# Patient Record
Sex: MALE | Race: WHITE | Employment: OTHER | URBAN - METROPOLITAN AREA
[De-identification: names, ages, dates, MRNs, and addresses within clinical notes are randomized per-mention and may not be internally consistent; named-entity substitution may affect disease eponyms.]

---

## 2017-12-31 PROBLEM — I10 ESSENTIAL HYPERTENSION: Chronic | Status: ACTIVE | Noted: 2017-12-31

## 2017-12-31 PROBLEM — N40.0 PROSTATE HYPERTROPHY: Chronic | Status: ACTIVE | Noted: 2017-12-31

## 2017-12-31 PROBLEM — S12.000A: Status: ACTIVE | Noted: 2017-12-31

## 2017-12-31 PROBLEM — S22.000A CLOSED COMPRESSION FRACTURE OF THORACIC VERTEBRA (HCC): Status: ACTIVE | Noted: 2017-12-31

## 2017-12-31 PROBLEM — S06.0X9A CONCUSSION WITH LOSS OF CONSCIOUSNESS: Status: ACTIVE | Noted: 2017-12-31

## 2018-01-02 PROBLEM — S12.001A CLOSED NONDISPLACED FRACTURE OF FIRST CERVICAL VERTEBRA (HCC): Status: ACTIVE | Noted: 2017-12-31

## 2024-10-05 ENCOUNTER — HOSPITAL ENCOUNTER (EMERGENCY)
Age: 79
Discharge: HOME | End: 2024-10-05
Attending: STUDENT IN AN ORGANIZED HEALTH CARE EDUCATION/TRAINING PROGRAM
Payer: COMMERCIAL

## 2024-10-05 ENCOUNTER — APPOINTMENT (OUTPATIENT)
Dept: CT IMAGING | Age: 79
End: 2024-10-05
Payer: COMMERCIAL

## 2024-10-05 VITALS
HEART RATE: 75 BPM | TEMPERATURE: 97.8 F | OXYGEN SATURATION: 98 % | DIASTOLIC BLOOD PRESSURE: 69 MMHG | SYSTOLIC BLOOD PRESSURE: 154 MMHG | RESPIRATION RATE: 15 BRPM

## 2024-10-05 DIAGNOSIS — I62.00 SUBDURAL BLEEDING (HCC): Primary | ICD-10-CM

## 2024-10-05 LAB
ALBUMIN SERPL-MCNC: 4.5 G/DL (ref 3.5–5.2)
ALP SERPL-CCNC: 85 U/L (ref 40–129)
ALT SERPL-CCNC: 20 U/L (ref 0–40)
ANION GAP SERPL CALCULATED.3IONS-SCNC: 11 MMOL/L (ref 7–16)
AST SERPL-CCNC: 20 U/L (ref 0–39)
BASOPHILS # BLD: 0.02 K/UL (ref 0–0.2)
BASOPHILS NFR BLD: 0 % (ref 0–2)
BILIRUB SERPL-MCNC: 0.6 MG/DL (ref 0–1.2)
BUN SERPL-MCNC: 11 MG/DL (ref 6–23)
CALCIUM SERPL-MCNC: 9.7 MG/DL (ref 8.6–10.2)
CHLORIDE SERPL-SCNC: 95 MMOL/L (ref 98–107)
CO2 SERPL-SCNC: 28 MMOL/L (ref 22–29)
CREAT SERPL-MCNC: 0.8 MG/DL (ref 0.7–1.2)
EOSINOPHIL # BLD: 0.17 K/UL (ref 0.05–0.5)
EOSINOPHILS RELATIVE PERCENT: 3 % (ref 0–6)
ERYTHROCYTE [DISTWIDTH] IN BLOOD BY AUTOMATED COUNT: 13.5 % (ref 11.5–15)
GFR, ESTIMATED: 89 ML/MIN/1.73M2
GLUCOSE SERPL-MCNC: 130 MG/DL (ref 74–99)
HCT VFR BLD AUTO: 39.1 % (ref 37–54)
HGB BLD-MCNC: 13.3 G/DL (ref 12.5–16.5)
IMM GRANULOCYTES # BLD AUTO: 0.03 K/UL (ref 0–0.58)
IMM GRANULOCYTES NFR BLD: 0 % (ref 0–5)
INR PPP: 1
LYMPHOCYTES NFR BLD: 1.2 K/UL (ref 1.5–4)
LYMPHOCYTES RELATIVE PERCENT: 17 % (ref 20–42)
MAGNESIUM SERPL-MCNC: 1.7 MG/DL (ref 1.6–2.6)
MCH RBC QN AUTO: 31.1 PG (ref 26–35)
MCHC RBC AUTO-ENTMCNC: 34 G/DL (ref 32–34.5)
MCV RBC AUTO: 91.4 FL (ref 80–99.9)
MONOCYTES NFR BLD: 0.52 K/UL (ref 0.1–0.95)
MONOCYTES NFR BLD: 8 % (ref 2–12)
NEUTROPHILS NFR BLD: 72 % (ref 43–80)
NEUTS SEG NFR BLD: 4.96 K/UL (ref 1.8–7.3)
PARTIAL THROMBOPLASTIN TIME: 30.6 SEC (ref 24.5–35.1)
PARTIAL THROMBOPLASTIN TIME: 31 SEC (ref 24.5–35.1)
PLATELET # BLD AUTO: 252 K/UL (ref 130–450)
PMV BLD AUTO: 9.7 FL (ref 7–12)
POTASSIUM SERPL-SCNC: 4.4 MMOL/L (ref 3.5–5)
PROT SERPL-MCNC: 7.4 G/DL (ref 6.4–8.3)
PROTHROMBIN TIME: 10.9 SEC (ref 9.3–12.4)
RBC # BLD AUTO: 4.28 M/UL (ref 3.8–5.8)
SODIUM SERPL-SCNC: 134 MMOL/L (ref 132–146)
TROPONIN I SERPL HS-MCNC: 12 NG/L (ref 0–11)
TROPONIN I SERPL HS-MCNC: 9 NG/L (ref 0–11)
WBC OTHER # BLD: 6.9 K/UL (ref 4.5–11.5)

## 2024-10-05 PROCEDURE — 85025 COMPLETE CBC W/AUTO DIFF WBC: CPT

## 2024-10-05 PROCEDURE — 93005 ELECTROCARDIOGRAM TRACING: CPT | Performed by: STUDENT IN AN ORGANIZED HEALTH CARE EDUCATION/TRAINING PROGRAM

## 2024-10-05 PROCEDURE — 80053 COMPREHEN METABOLIC PANEL: CPT

## 2024-10-05 PROCEDURE — 85610 PROTHROMBIN TIME: CPT

## 2024-10-05 PROCEDURE — 6360000002 HC RX W HCPCS: Performed by: STUDENT IN AN ORGANIZED HEALTH CARE EDUCATION/TRAINING PROGRAM

## 2024-10-05 PROCEDURE — 96374 THER/PROPH/DIAG INJ IV PUSH: CPT

## 2024-10-05 PROCEDURE — 85730 THROMBOPLASTIN TIME PARTIAL: CPT

## 2024-10-05 PROCEDURE — 96375 TX/PRO/DX INJ NEW DRUG ADDON: CPT

## 2024-10-05 PROCEDURE — 70450 CT HEAD/BRAIN W/O DYE: CPT

## 2024-10-05 PROCEDURE — 83735 ASSAY OF MAGNESIUM: CPT

## 2024-10-05 PROCEDURE — 99284 EMERGENCY DEPT VISIT MOD MDM: CPT

## 2024-10-05 PROCEDURE — 84484 ASSAY OF TROPONIN QUANT: CPT

## 2024-10-05 RX ORDER — HYDRALAZINE HYDROCHLORIDE 20 MG/ML
10 INJECTION INTRAMUSCULAR; INTRAVENOUS ONCE
Status: COMPLETED | OUTPATIENT
Start: 2024-10-05 | End: 2024-10-05

## 2024-10-05 RX ORDER — LEVETIRACETAM 500 MG/5ML
500 INJECTION, SOLUTION, CONCENTRATE INTRAVENOUS ONCE
Status: COMPLETED | OUTPATIENT
Start: 2024-10-05 | End: 2024-10-05

## 2024-10-05 RX ORDER — DIPHENHYDRAMINE HYDROCHLORIDE 50 MG/ML
25 INJECTION INTRAMUSCULAR; INTRAVENOUS ONCE
Status: COMPLETED | OUTPATIENT
Start: 2024-10-05 | End: 2024-10-05

## 2024-10-05 RX ORDER — FENTANYL CITRATE 50 UG/ML
50 INJECTION, SOLUTION INTRAMUSCULAR; INTRAVENOUS ONCE
Status: COMPLETED | OUTPATIENT
Start: 2024-10-05 | End: 2024-10-05

## 2024-10-05 RX ORDER — LEVETIRACETAM 500 MG/1
500 TABLET ORAL 2 TIMES DAILY
Qty: 60 TABLET | Refills: 0 | Status: SHIPPED | OUTPATIENT
Start: 2024-10-05 | End: 2024-11-04

## 2024-10-05 RX ORDER — PROCHLORPERAZINE EDISYLATE 5 MG/ML
10 INJECTION INTRAMUSCULAR; INTRAVENOUS ONCE
Status: COMPLETED | OUTPATIENT
Start: 2024-10-05 | End: 2024-10-05

## 2024-10-05 RX ADMIN — LEVETIRACETAM 500 MG: 100 INJECTION INTRAVENOUS at 16:45

## 2024-10-05 RX ADMIN — HYDRALAZINE HYDROCHLORIDE 10 MG: 20 INJECTION INTRAMUSCULAR; INTRAVENOUS at 16:00

## 2024-10-05 RX ADMIN — PROCHLORPERAZINE EDISYLATE 10 MG: 5 INJECTION INTRAMUSCULAR; INTRAVENOUS at 15:31

## 2024-10-05 RX ADMIN — FENTANYL CITRATE 50 MCG: 50 INJECTION, SOLUTION INTRAMUSCULAR; INTRAVENOUS at 16:46

## 2024-10-05 RX ADMIN — DIPHENHYDRAMINE HYDROCHLORIDE 25 MG: 50 INJECTION, SOLUTION INTRAMUSCULAR; INTRAVENOUS at 15:28

## 2024-10-05 ASSESSMENT — LIFESTYLE VARIABLES
HOW MANY STANDARD DRINKS CONTAINING ALCOHOL DO YOU HAVE ON A TYPICAL DAY: 1 OR 2
HOW OFTEN DO YOU HAVE A DRINK CONTAINING ALCOHOL: MONTHLY OR LESS

## 2024-10-05 ASSESSMENT — PAIN - FUNCTIONAL ASSESSMENT: PAIN_FUNCTIONAL_ASSESSMENT: NONE - DENIES PAIN

## 2024-10-05 ASSESSMENT — PAIN SCALES - GENERAL: PAINLEVEL_OUTOF10: 10

## 2024-10-05 NOTE — ED NOTES
Armando valdez stopped this rn in the bartholomew to inform me of his pain of the right foot\. Dr notified orders received.

## 2024-10-05 NOTE — ED PROVIDER NOTES
[facility] EMERGENCY DEPARTMENT  EMERGENCY DEPARTMENT ENCOUNTER    Pt Name: Viktor Johnson  MRN: [id number]  Birthdate 1945  Date of evaluation: 10/5/2024  Provider: Alberto Robertson MD  PCP: Amish Garcia DO  Note Started: 2:45 PM EDT 10/5/24    HPI     Patient is a 79 y.o. male presents with a chief complaint of   Chief Complaint   Patient presents with    Headache     Patient states he was seen in [place] for a brain bleed and was sent home. Patient states the pain has gotten worse and feels like an ice pick in his head at times. - thinners. Patient also c/o numbness in right foot and hand. Patient c/o new visual changes this morning in right eye   .    Patient presents for right-sided tingling as well as headache.  Patient stated that a week ago he was at [place] at which time he was noted to have a intracranial bleed that they did MRIs and 2 CT scans.  Patient was then discharged home.  Patient stated that he has been trying to follow-up but has been having worsening headaches since then.  Patient denies any fevers, chills, nausea, vomiting, abdominal pain, changes in urinary bowel habits.  Patient also states that he has been having some increased blurriness of vision and is in both eyes.  The symptoms have been on and off.  Patient does take aspirin but stopped taking since he was diagnosed with his intracranial hemorrhage    Nursing Notes were all reviewed and agreed with or any disagreements were addressed in the HPI.    History From: Patient    Review of Systems   Pertinent positives and negatives as per HPI.     Physical Exam  Vitals and nursing note reviewed.   Constitutional:       Appearance: He is well-developed.   HENT:      Head: Normocephalic and atraumatic.   Eyes:      Conjunctiva/sclera: Conjunctivae normal.   Cardiovascular:      Rate and Rhythm: Normal rate and regular rhythm.      Heart sounds: Normal heart sounds. No murmur    Sat Oct 05, 2024   1521 EKG read interpreted 61 beats normal.  First-degree AV block no ST elevations or depressions.  No ST elevations or depressions. [JM]   1612 Discussed case with Dr. Talbert.  Stated that patient to monitor and follow-up as an outpatient.  Stated that patient should be started on 500 Keppra twice daily.  Will have patient follow-up as an outpatient [JM]      ED Course User Index  [JM] Alberto Robertson MD      Patient is a 79 y.o. male presenting with subdural.    Patient's differential includes but is not limited to subdural, electrolyte abnormality, migraine.  Tests reviewed: Patient did reportedly have a known subdural.  I am unable to find these images.  Patient was immediately sent over for CT and I saw patient in triage.  Not felt to have a stroke given that patient's symptoms have been on and off for a few days.  Patient does have some mild loss sensation but otherwise clinically appears well.  Patient does have a known history of hypertensionto be on hypertensive medications.  Patient did have stable troponins.  EKG shows no significant findings.  Patient CMP shows no acute findings.  After conversation with access center and I discussed case with patient's neurosurgeon at [facility].  Stuart that this subdural is stable.  Patient was educated at length did not take any NSAIDs.  After discussion with neurosurgery patient will be started on Keppra.  Patient will follow-up with neurosurgery.  Patient CT scan does show what appears to be subacute subdurals.  After discussion with neurosurgery as well as patient and patient's wife patient will follow-up as an outpatient.    Patient received 35 minutes critical care time and this excludes all billable procedures  External notes reviewed: Patient's previous neurosurgery note emergency on 5/1/2018 at which time patient was known to have back pain    Patient was given return precautions.  Patient will follow up with their primary care provider.

## 2024-10-05 NOTE — DISCHARGE INSTRUCTIONS
Do not take NSAIDs.    Return if any new or worsening symptoms.  Return if any chest pain or shortness of breath.  Follow-up with your primary care provider.  Return if any worsening headache or change in symptoms

## 2024-10-06 LAB
EKG ATRIAL RATE: 61 BPM
EKG P AXIS: 45 DEGREES
EKG P-R INTERVAL: 214 MS
EKG Q-T INTERVAL: 404 MS
EKG QRS DURATION: 98 MS
EKG QTC CALCULATION (BAZETT): 406 MS
EKG R AXIS: 51 DEGREES
EKG T AXIS: 39 DEGREES
EKG VENTRICULAR RATE: 61 BPM

## 2024-10-07 PROCEDURE — 93010 ELECTROCARDIOGRAM REPORT: CPT | Performed by: INTERNAL MEDICINE
